# Patient Record
Sex: FEMALE | Race: AMERICAN INDIAN OR ALASKA NATIVE
[De-identification: names, ages, dates, MRNs, and addresses within clinical notes are randomized per-mention and may not be internally consistent; named-entity substitution may affect disease eponyms.]

---

## 2018-02-25 ENCOUNTER — HOSPITAL ENCOUNTER (EMERGENCY)
Dept: HOSPITAL 5 - ED | Age: 58
Discharge: HOME | End: 2018-02-25
Payer: COMMERCIAL

## 2018-02-25 VITALS — SYSTOLIC BLOOD PRESSURE: 153 MMHG | DIASTOLIC BLOOD PRESSURE: 80 MMHG

## 2018-02-25 DIAGNOSIS — J20.9: Primary | ICD-10-CM

## 2018-02-25 DIAGNOSIS — R91.1: ICD-10-CM

## 2018-02-25 DIAGNOSIS — E78.00: ICD-10-CM

## 2018-02-25 LAB
BASOPHILS # (AUTO): 0 K/MM3 (ref 0–0.1)
BASOPHILS NFR BLD AUTO: 0.8 % (ref 0–1.8)
BUN SERPL-MCNC: 10 MG/DL (ref 7–17)
BUN/CREAT SERPL: 14 %
CALCIUM SERPL-MCNC: 9 MG/DL (ref 8.4–10.2)
EOSINOPHIL # BLD AUTO: 0.1 K/MM3 (ref 0–0.4)
EOSINOPHIL NFR BLD AUTO: 1.3 % (ref 0–4.3)
HCT VFR BLD CALC: 41.9 % (ref 30.3–42.9)
HEMOLYSIS INDEX: 3
HGB BLD-MCNC: 14.2 GM/DL (ref 10.1–14.3)
LYMPHOCYTES # BLD AUTO: 1.4 K/MM3 (ref 1.2–5.4)
LYMPHOCYTES NFR BLD AUTO: 24.1 % (ref 13.4–35)
MCH RBC QN AUTO: 29 PG (ref 28–32)
MCHC RBC AUTO-ENTMCNC: 34 % (ref 30–34)
MCV RBC AUTO: 85 FL (ref 79–97)
MONOCYTES # (AUTO): 0.3 K/MM3 (ref 0–0.8)
MONOCYTES % (AUTO): 5 % (ref 0–7.3)
PLATELET # BLD: 198 K/MM3 (ref 140–440)
RBC # BLD AUTO: 4.93 M/MM3 (ref 3.65–5.03)

## 2018-02-25 PROCEDURE — 36415 COLL VENOUS BLD VENIPUNCTURE: CPT

## 2018-02-25 PROCEDURE — 93010 ELECTROCARDIOGRAM REPORT: CPT

## 2018-02-25 PROCEDURE — 85025 COMPLETE CBC W/AUTO DIFF WBC: CPT

## 2018-02-25 PROCEDURE — 84484 ASSAY OF TROPONIN QUANT: CPT

## 2018-02-25 PROCEDURE — 71046 X-RAY EXAM CHEST 2 VIEWS: CPT

## 2018-02-25 PROCEDURE — 80048 BASIC METABOLIC PNL TOTAL CA: CPT

## 2018-02-25 PROCEDURE — 85379 FIBRIN DEGRADATION QUANT: CPT

## 2018-02-25 PROCEDURE — 93005 ELECTROCARDIOGRAM TRACING: CPT

## 2018-02-25 NOTE — XRAY REPORT
Chest 2 views:



History: Dyspnea.



Findings:



Normal cardiomediastinal silhouette. The trachea is midline. 

Circumscribed density measuring 2 cm in diameter noted at the right 

lower lobe adjacent to the diaphragm. This may be part of normal 

configuration of diaphragm or round atelectasis or pneumonitis. Not 

seen on the lateral radiograph. Normal CP angles.



Impression:



Findings as detailed above. Followup or further evaluation is 

recommended if clinical indicated







Impression:

## 2018-02-25 NOTE — EMERGENCY DEPARTMENT REPORT
ED Shortness of Breath HPI





- General


Chief Complaint: Dyspnea/Respdistress


Stated Complaint: SHORTNESS OF BREATH


Time Seen by Provider: 02/25/18 13:07


Source: patient (She has had productive cough and SHOB especially on exertion 

for 3 weeks. Improved a little on Doxycycline that was prescribed for acute 

bronchitis but the symptoms are still there. No h/o chronic lung disease. )


Mode of arrival: Ambulatory


Limitations: No Limitations





- Related Data


 Previous Rx's











 Medication  Instructions  Recorded  Last Taken  Type


 


Ciprofloxacin HCl [Cipro] 500 mg PO BID 10 Days  tablet 02/25/18 Unknown Rx


 


Prednisone 50 mg PO DAILY 7 Days  tablet 02/25/18 Unknown Rx











 Allergies











Allergy/AdvReac Type Severity Reaction Status Date / Time


 


No Known Allergies Allergy   Unverified 02/25/18 10:36














ED Review of Systems


ROS: 


Stated complaint: SHORTNESS OF BREATH


Other details as noted in HPI





Constitutional: denies: chills, fever


Eyes: denies: eye pain, eye discharge, vision change


ENT: denies: ear pain, throat pain


Respiratory: see HPI, cough, shortness of breath.  denies: wheezing


Cardiovascular: denies: chest pain, palpitations


Endocrine: no symptoms reported


Gastrointestinal: denies: abdominal pain, nausea, diarrhea


Genitourinary: denies: urgency, dysuria, discharge


Musculoskeletal: denies: back pain, joint swelling, arthralgia


Skin: denies: rash, lesions


Neurological: denies: headache, weakness, paresthesias


Psychiatric: denies: anxiety, depression


Hematological/Lymphatic: denies: easy bleeding, easy bruising





ED Past Medical Hx





- Past Medical History


Additional medical history: high cholesterol





- Surgical History


Additional Surgical History: cyst removed from right armpit and left breast





- Social History


Smoking Status: Never Smoker


Substance Use Type: Alcohol





- Medications


Home Medications: 


 Home Medications











 Medication  Instructions  Recorded  Confirmed  Last Taken  Type


 


Ciprofloxacin HCl [Cipro] 500 mg PO BID 10 Days  tablet 02/25/18  Unknown Rx


 


Prednisone 50 mg PO DAILY 7 Days  tablet 02/25/18  Unknown Rx














ED Physical Exam





- General


Limitations: No Limitations


General appearance: alert, in no apparent distress





- Head


Head exam: Present: atraumatic, normocephalic





- Eye


Eye exam: Present: normal appearance





- ENT


ENT exam: Present: mucous membranes moist





- Neck


Neck exam: Present: normal inspection





- Respiratory


Respiratory exam: Present: normal lung sounds bilaterally.  Absent: respiratory 

distress





- Cardiovascular


Cardiovascular Exam: Present: regular rate, normal rhythm.  Absent: systolic 

murmur, diastolic murmur, rubs, gallop





- GI/Abdominal


GI/Abdominal exam: Present: soft, normal bowel sounds





- Extremities Exam


Extremities exam: Present: normal inspection





- Back Exam


Back exam: Present: normal inspection





- Neurological Exam


Neurological exam: Present: alert, oriented X3





- Psychiatric


Psychiatric exam: Present: normal affect, normal mood





- Skin


Skin exam: Present: warm, dry, intact, normal color.  Absent: rash





ED Course





 Vital Signs











  02/25/18 02/25/18 02/25/18





  10:32 12:46 13:00


 


Temperature 98.7 F  


 


Pulse Rate 74 54 L 56 L


 


Respiratory 16 14 15





Rate   


 


Blood Pressure 165/71 166/78 154/75


 


O2 Sat by Pulse 100 100 99





Oximetry   














  02/25/18 02/25/18 02/25/18





  13:16 13:46 14:00


 


Temperature   


 


Pulse Rate 57 L 64 62


 


Respiratory 14 14 17





Rate   


 


Blood Pressure 154/75 151/74 153/80


 


O2 Sat by Pulse 99 100 99





Oximetry   














  02/25/18 02/25/18





  14:16 14:24


 


Temperature  


 


Pulse Rate 62 


 


Respiratory 18 18





Rate  


 


Blood Pressure 153/80 


 


O2 Sat by Pulse 100 





Oximetry  














- Reevaluation(s)


Reevaluation #1: 





02/25/18 14:39


Her breathing is better. I explained to her the CXR findings and asked her to 

follow up with her PCP for the presumably lung nodule.





ED Medical Decision Making





- Lab Data


Result diagrams: 


 02/25/18 11:05





 02/25/18 11:05


Critical care attestation.: 


If time is entered above; I have spent that time in minutes in the direct care 

of this critically ill patient, excluding procedure time.








ED Disposition


Clinical Impression: 


 Lung nodule seen on imaging study





Acute bronchitis


Qualifiers:


 Bronchitis organism: unspecified organism Qualified Code(s): J20.9 - Acute 

bronchitis, unspecified





Disposition: DC-01 TO HOME OR SELFCARE


Is pt being admited?: No


Does the pt Need Aspirin: No


Condition: Stable


Instructions:  Acute Bronchitis (ED)


Prescriptions: 


Ciprofloxacin HCl [Cipro] 500 mg PO BID 10 Days  tablet


Prednisone 50 mg PO DAILY 7 Days  tablet


Referrals: 


RAJENDRA PABLO MD [Primary Care Provider] - 3-5 Days


Time of Disposition: 14:36